# Patient Record
Sex: FEMALE | Race: WHITE | Employment: FULL TIME | URBAN - METROPOLITAN AREA
[De-identification: names, ages, dates, MRNs, and addresses within clinical notes are randomized per-mention and may not be internally consistent; named-entity substitution may affect disease eponyms.]

---

## 2020-01-12 ENCOUNTER — HOSPITAL ENCOUNTER (EMERGENCY)
Age: 65
Discharge: HOME | End: 2020-01-12
Attending: EMERGENCY MEDICINE
Payer: COMMERCIAL

## 2020-01-12 ENCOUNTER — APPOINTMENT (OUTPATIENT)
Dept: CT IMAGING | Age: 65
End: 2020-01-12
Payer: COMMERCIAL

## 2020-01-12 VITALS
WEIGHT: 127 LBS | TEMPERATURE: 97.2 F | DIASTOLIC BLOOD PRESSURE: 84 MMHG | OXYGEN SATURATION: 98 % | RESPIRATION RATE: 14 BRPM | HEIGHT: 68 IN | BODY MASS INDEX: 19.25 KG/M2 | HEART RATE: 80 BPM | SYSTOLIC BLOOD PRESSURE: 121 MMHG

## 2020-01-12 PROCEDURE — 99284 EMERGENCY DEPT VISIT MOD MDM: CPT

## 2020-01-12 PROCEDURE — 70450 CT HEAD/BRAIN W/O DYE: CPT

## 2020-01-12 NOTE — ED PROVIDER NOTES
[address] ENCOUNTER          ATTENDING PHYSICIAN NOTE       Date of evaluation: 1/12/2020    Chief Complaint     Other (has been having periods of \"shakiness\" \"like my equilibrium is off\" and \"funny feeling in bilateral ears since a syncopal event last week has been to urgent care and pcp without any answers)      History of Present Illness     Alissa Tello is a 62 y.o. female with PMH of hypothyroidism who presents to the ED for not feeling well and intermittent dizziness.  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  -Patient states that she syncopized at that point and may have hit her head. Occurred 6 days ago. -Patient states that EMS was called but she declined transport and did not go to the ED.  -Patient states that a few days later she went to urgent care and had a negative flu swab. -Patient states that 2 days ago she still was not feeling well and so she followed up with her primary care physician who did an EKG and other labs. -Patient notes that this weekend she still has not been feeling super well, has been having some dizziness that she describes more like the room is spinning states that it comes and goes. She states that she feels some congestion and some fullness in her ears as well.  -She denies any neurological deficits changes to her vision or other symptoms.  -Patient notes that she is most concerned that she may be hit her head as she feels a bump on the back of her head. -Denies any chest pain shortness of breath or any other symptoms. Bedsides as noted above, no other aggravating or alleviating factors.     Review of Systems     Review of Systems    All Other systems reviewed and are negative    Past Medical, Surgical, Family, and Social History     She has a past medical history of Factor V Leiden ([address] and Hypothyroidism. She has no past surgical history on file. Her family history is not on file. She reports that she has never smoked. She has never used smokeless tobacco. She reports current alcohol use. She reports that she does not use drugs. Medications     Previous Medications    No medications on file       Allergies     She has No Known Allergies. Physical Exam     INITIAL VITALS: BP: 121/84, Temp: 97.2 °F (36.2 °C), Pulse: 80, Resp: 14, SpO2: 98 %   Physical Exam    Gen: Awake, alert, NAD  Head: NC/AT  Eyes: PERRLA, EOMI, anicteric sclera  ENT: MMM, normal posterior oropharynx, +effusion to behind left TM  Neck: Supple, full ROM, no midline C-spine tenderness  Cardio: RRR, no MRG, appears well perfused  Pulm: CTAB, nonlabored  Abd: Soft, ND, NTTP  Extremities: WWP, 2+ Radial and DP pulses b/l  Neuro: AAOx3, moving all extremities, no focal deficits, CN II-XII grossly intact, 5/5 motor strength and normal sensation in all 4 extremities, no pronator drift, b/l, normal finger-nose-finger b/l, normal heel-shin b/l, normal gait, negative Romberg  Psych: Cooperative      Diagnostic Results         RADIOLOGY:  CT HEAD WO CONTRAST   Final Result      1. No acute intracranial process. LABS:   No results found for this visit on 01/12/20. RECENT VITALS:  BP: 121/84,Temp: 97.2 °F (36.2 °C), Pulse: 80, Resp: 14, SpO2: 98 %     Procedures         ED Course     Nursing Notes, Past Medical Hx, Past Surgical Hx, Social Hx,Allergies, and Family Hx were reviewed. patient was given the following medications:  No orders of the defined types were placed in this encounter. CONSULTS:  None    MEDICAL DECISIONMAKING / ASSESSMENT / PLAN     Deacon Tan is a 62 y.o. female with PMH of hypothyroidism who presents to the ED for not feeling well and intermittent dizziness.     MDM: Patient presents emergency department for intermittent dizziness in the setting of a viral syndrome infection this week. Patient is well-appearing neurologically intact is ambulatory around the room. Dizziness is transient and seems to come and go. Low suspicion for an acute CVA in this patient. Patient is most concerned about her head and is concerned she thinks that she hit her head. Given this I obtain a CT brain that is negative. I offered patient labs as well as EKG chest x-ray and fluids and she declined all of these and states that she was really just concerned about making sure that her head was okay. At this point I suspect the patient has a viral infection as well as she does have a mild effusion to her left ear. The patient that she can take some over-the-counter decongestants to help with her symptoms and instructed her to follow-up with her primary care physician this week. Pt given strict return precautions and follow-up instructions. Pt given the opportunity to ask questions and is in agreement with the plan. Pt is stable for discharge. Clinical Impression     1. Viral syndrome        Disposition     PATIENT REFERRED TO:  GASTON Johnson - CNP  [address] [facility] [address] [facility]  [phone]    Schedule an appointment as soon as possible for a visit in 1 day  Follow up on ED visit      DISCHARGE MEDICATIONS:  New Prescriptions    No medications on file       DISPOSITION Decision To Discharge 01/12/2020 05:03:03 PM    Discharge    Virgilio Maldonado MD   [facility]    This chart was generated in part by using Dragon Dictation system and may contain errors related to that system including errors in grammar, punctuation, and spelling, as well as words and phrases that may be inappropriate. When dictating, effort is made to correct spelling/grammar errors. If there are any questions or concerns please feel free to contact the dictating provider for clarification.            Key Armijo MD  01/12/20 7021